# Patient Record
Sex: FEMALE | Race: BLACK OR AFRICAN AMERICAN
[De-identification: names, ages, dates, MRNs, and addresses within clinical notes are randomized per-mention and may not be internally consistent; named-entity substitution may affect disease eponyms.]

---

## 2020-02-04 ENCOUNTER — HOSPITAL ENCOUNTER (EMERGENCY)
Dept: HOSPITAL 72 - EMR | Age: 50
Discharge: HOME | End: 2020-02-04
Payer: MEDICAID

## 2020-02-04 VITALS — SYSTOLIC BLOOD PRESSURE: 132 MMHG | DIASTOLIC BLOOD PRESSURE: 90 MMHG

## 2020-02-04 VITALS — HEIGHT: 67 IN | BODY MASS INDEX: 43.95 KG/M2 | WEIGHT: 280 LBS

## 2020-02-04 VITALS — DIASTOLIC BLOOD PRESSURE: 88 MMHG | SYSTOLIC BLOOD PRESSURE: 127 MMHG

## 2020-02-04 DIAGNOSIS — E66.9: ICD-10-CM

## 2020-02-04 DIAGNOSIS — I10: ICD-10-CM

## 2020-02-04 DIAGNOSIS — R60.0: Primary | ICD-10-CM

## 2020-02-04 DIAGNOSIS — M25.562: ICD-10-CM

## 2020-02-04 DIAGNOSIS — M25.561: ICD-10-CM

## 2020-02-04 PROCEDURE — 99282 EMERGENCY DEPT VISIT SF MDM: CPT

## 2020-02-04 NOTE — NUR
ED Nurse Note:

Pt ambulated to ed c/o bilateral leg swelling x 1 month and pain on the right 
knee. pt is able to ambualte with steady gait with no pain. pt also reports 
shortness of breath, but NAD noted. no audible wheezes noted. skin intact.

## 2020-02-04 NOTE — NUR
ER DISCHARGE NOTE:

Patient is cleared to be discharged per ERMD, pt is aox4, on room air, with 
stable vital signs. pt was given dc instructions, pt was able to verbalize 
understanding, pt id band removed. pt is able to ambulate with steady gait. pt 
took all belongings.

## 2020-02-04 NOTE — EMERGENCY ROOM REPORT
History of Present Illness


General


Chief Complaint:  Edema


Source:  Patient





Present Illness


HPI


Patient is a 49-year-old female presents after bilateral lower extremity 

swelling for the past 1 month.  Patient is currently taking Norvasc.  She 

denies any increased difficulty with breathing.  She had been taking 

medications regularly.  She denies taking diuretics.  Reports having some 

bilateral knee pain as well.  Denies any numbness.  Prior history of 

schizophrenia.  Denies any other locations of pain.


Allergies:  


Coded Allergies:  


     No Known Allergies (Unverified , 1/25/20)





Patient History


Past Medical History:  see triage record


Last Menstrual Period:  01/25/2020


Pregnant Now:  No


Reviewed Nursing Documentation:  PMH: Agreed; PSxH: Agreed





Nursing Documentation-PMH


Hx Hypertension:  Yes





Review of Systems


All Other Systems:  negative except mentioned in HPI





Physical Exam





Vital Signs








  Date Time  Temp Pulse Resp B/P (MAP) Pulse Ox O2 Delivery O2 Flow Rate FiO2


 


2/4/20 08:56 97.5 76 16 132/90 (104) 97 Room Air  








General Appearance:  well appearing, no apparent distress, alert, GCS 15, obese


Head:  normocephalic, atraumatic


ENT:  hearing grossly normal, normal voice


Neck:  full range of motion, supple


Respiratory:  lungs clear, no respiratory distress, speaking full sentences


Cardiovascular #1:  normal inspection, edema - Trace edema


Gastrointestinal:  normal inspection, normal bowel sounds, non tender, soft


Musculoskeletal:  normal inspection, no calf tenderness


Neurologic:  alert, motor strength/tone normal, CNs III-XII nml as tested, 

normal gait


Psychiatric:  normal inspection, judgement/insight normal, mood/affect normal


Skin:  no rash





Medical Decision Making


Diagnostic Impression:  


 Primary Impression:  


 Peripheral edema


ER Course


Patient presented for bilateral leg swelling.  Differential diagnosis include 

was not limited to arthritis, peripheral edema, CHF among others.  Patient has 

a benign exam and does not appear to require any imaging or laboratory testing 

at this time.  Patient does not appear to have any evidence of significant 

swelling to her legs.  She does appear to have some mild arthritis.  Patient 

was advised to seek primary care physician and was given a short-term 

prescription for diuretic. Patient's lung sounds are clear and this appears to 

be somewhat chronic. She was advised to take diuretic for the next 2 to 3 days 

to reduce swelling.  She is advised to return if worse.    Patient is advised 

to return if any worsening condition or if any changes in status that are 

concerning.





This report is dictated with Dragon transcription software which may 

occasionally lead to discrepancies related to use of this software.





Last Vital Signs








  Date Time  Temp Pulse Resp B/P (MAP) Pulse Ox O2 Delivery O2 Flow Rate FiO2


 


2/4/20 09:00 97.5 76 16 132/90 97 Room Air  








Status:  improved


Disposition:  HOME, SELF-CARE


Condition:  Stable


Scripts


Hydrochlorothiazide* (HYDROCHLOROTHIAZIDE*) 25 Mg Tablet


25 MG ORAL DAILY, #14 TAB


   Prov: Blue Sierar MD         2/4/20


Patient Instructions:  Peripheral Edema











Blue Sierra MD Feb 4, 2020 09:19